# Patient Record
Sex: FEMALE
[De-identification: names, ages, dates, MRNs, and addresses within clinical notes are randomized per-mention and may not be internally consistent; named-entity substitution may affect disease eponyms.]

---

## 2022-02-16 ENCOUNTER — NURSE TRIAGE (OUTPATIENT)
Dept: OTHER | Facility: CLINIC | Age: 62
End: 2022-02-16

## 2022-02-16 NOTE — TELEPHONE ENCOUNTER
Subjective: Caller states \"she fell on the ice and hit her head\"     Current Symptoms: slipped and hit her head on ice, no LOC, denies blurred vision or headache, feels tired wants to sleep, denies vomiting, denies balance issues. Took a shower after the fall and is currently laying down    Onset: 45 minutes ago; sudden    Associated Symptoms: NA    Pain Severity: 0/10; N/A; none    Temperature:no fever by unknown method    What has been tried: nothing    LMP: NA Pregnant: NA    Recommended disposition: follow home care advice    Care advice provided, patient verbalizes understanding; denies any other questions or concerns; instructed to call back for any new or worsening symptoms. follow home care advice     This triage is a result of a call to [address] [place] [address]. Please do not respond to the triage nurse through this encounter. Any subsequent communication should be directly with the patient.       Reason for Disposition   Scalp swelling, bruise or pain    Protocols used: HEAD INJURY-ADULT-

## 2022-06-16 ENCOUNTER — NURSE TRIAGE (OUTPATIENT)
Dept: OTHER | Facility: CLINIC | Age: 62
End: 2022-06-16

## 2022-11-20 ENCOUNTER — NURSE TRIAGE (OUTPATIENT)
Dept: OTHER | Facility: CLINIC | Age: 62
End: 2022-11-20

## 2022-11-20 NOTE — TELEPHONE ENCOUNTER
Location of patient: [place]     Subjective: Caller states \" I think I have a UTI. I went to an [facility] on 10/31 and completed a course of antibiotic. I felt like I still had a UTI and went back to the [facility] and they cultured my urine. They told me I still had a UTI. This time they gave me Bactrim I have taken 6 pills. I am still in a lot of pain. Do you think I need to go to the ER? Current Symptoms:   -denies flank pain   +urine is yellow - denies blood in urine   +stomach feels full   +feel lightheaded   +pain with urination and when not urinating   +lower pelvic pain   +\"pain is getting worse\"     Onset:   10/31/22- worsening symptoms     Associated Symptoms: NA    Pain Severity: 7/10; fullness ; constant  +constant burning, pinching     Temperature: Denies      What has been tried: prescribed antibiotics     LMP: NA Pregnant: NA    Recommended disposition: See HCP within 4 Hours (or PCP triage)    Care advice provided, patient verbalizes understanding; denies any other questions or concerns; instructed to call back for any new or worsening symptoms. Patient/caller agrees to proceed to local  Emergency Department    Pt in agreement and wants to be seen in the ER after being seen in the [facility] twice. This triage is a result of a call to [address] [place] [address] of [place]. Please do not respond to the triage nurse through this encounter. Any subsequent communication should be directly with the patient.   Reason for Disposition   [1] Taking antibiotic > 24 hours for UTI AND [2] flank or lower back pain worsening    Protocols used: Urinary Tract Infection on Antibiotic Follow-up Call - [facility]

## 2023-01-02 ENCOUNTER — NURSE TRIAGE (OUTPATIENT)
Dept: OTHER | Facility: CLINIC | Age: 63
End: 2023-01-02

## 2023-01-02 NOTE — TELEPHONE ENCOUNTER
Subjective: Caller states \"these are some covid questions\"     Current Symptoms: No symptoms, questions about covid exposure     Recommended disposition: Sarah Schreiber 4766 advice provided, patient verbalizes understanding; denies any other questions or concerns; instructed to call back for any new or worsening symptoms. Home care    This triage is a result of a call to [address] [place] [address]. Please do not respond to the triage nurse through this encounter. Any subsequent communication should be directly with the patient.       Reason for Disposition   [1] COVID-19 EXPOSURE within last 14 days AND [2] NO symptoms    Protocols used: Coronavirus (COVID-19) Exposure-ADULT-